# Patient Record
Sex: MALE | Race: BLACK OR AFRICAN AMERICAN | NOT HISPANIC OR LATINO | Employment: OTHER | URBAN - METROPOLITAN AREA
[De-identification: names, ages, dates, MRNs, and addresses within clinical notes are randomized per-mention and may not be internally consistent; named-entity substitution may affect disease eponyms.]

---

## 2021-07-14 ENCOUNTER — TELEPHONE (OUTPATIENT)
Dept: PHYSICAL MEDICINE AND REHAB | Facility: CLINIC | Age: 46
End: 2021-07-14

## 2021-07-14 NOTE — TELEPHONE ENCOUNTER
Returned phone call to patient and left VM message that [facility] does not do functional capacity evaluations for work, but that they could contact Occupational Medicine for this assessment. Left a few different phone numbers of occupational medicine facilities in [place].

## 2021-07-14 NOTE — TELEPHONE ENCOUNTER
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 With mixed communication about referral and pt having no Dx or Symptoms writer was unable to schedule, please call Watson back for scheduling options     Action Taken: Message routed to:  [facility] ([facility]    Travel Screening: Not Applicable